# Patient Record
Sex: FEMALE | Race: BLACK OR AFRICAN AMERICAN | NOT HISPANIC OR LATINO
[De-identification: names, ages, dates, MRNs, and addresses within clinical notes are randomized per-mention and may not be internally consistent; named-entity substitution may affect disease eponyms.]

---

## 2017-02-10 ENCOUNTER — APPOINTMENT (OUTPATIENT)
Dept: ORTHOPEDIC SURGERY | Facility: CLINIC | Age: 62
End: 2017-02-10

## 2017-05-22 ENCOUNTER — APPOINTMENT (OUTPATIENT)
Dept: ORTHOPEDIC SURGERY | Facility: CLINIC | Age: 62
End: 2017-05-22

## 2017-08-01 ENCOUNTER — OUTPATIENT (OUTPATIENT)
Dept: OUTPATIENT SERVICES | Facility: HOSPITAL | Age: 62
LOS: 1 days | End: 2017-08-01
Payer: MEDICAID

## 2017-08-01 VITALS
DIASTOLIC BLOOD PRESSURE: 84 MMHG | TEMPERATURE: 98 F | RESPIRATION RATE: 16 BRPM | OXYGEN SATURATION: 99 % | SYSTOLIC BLOOD PRESSURE: 136 MMHG | HEIGHT: 65 IN | HEART RATE: 65 BPM | WEIGHT: 158.95 LBS

## 2017-08-01 DIAGNOSIS — T84.038A MECHANICAL LOOSENING OF OTHER INTERNAL PROSTHETIC JOINT, INITIAL ENCOUNTER: ICD-10-CM

## 2017-08-01 DIAGNOSIS — Z98.890 OTHER SPECIFIED POSTPROCEDURAL STATES: Chronic | ICD-10-CM

## 2017-08-01 DIAGNOSIS — Z98.1 ARTHRODESIS STATUS: Chronic | ICD-10-CM

## 2017-08-01 DIAGNOSIS — Z90.11 ACQUIRED ABSENCE OF RIGHT BREAST AND NIPPLE: Chronic | ICD-10-CM

## 2017-08-01 DIAGNOSIS — I10 ESSENTIAL (PRIMARY) HYPERTENSION: ICD-10-CM

## 2017-08-01 DIAGNOSIS — Z91.040 LATEX ALLERGY STATUS: ICD-10-CM

## 2017-08-01 DIAGNOSIS — Z96.652 PRESENCE OF LEFT ARTIFICIAL KNEE JOINT: Chronic | ICD-10-CM

## 2017-08-01 LAB
ALBUMIN SERPL ELPH-MCNC: 3.9 G/DL — SIGNIFICANT CHANGE UP (ref 3.3–5)
ALP SERPL-CCNC: 67 U/L — SIGNIFICANT CHANGE UP (ref 40–120)
ALT FLD-CCNC: 11 U/L — SIGNIFICANT CHANGE UP (ref 4–33)
APPEARANCE UR: CLEAR — SIGNIFICANT CHANGE UP
APTT BLD: 27 SEC — LOW (ref 27.5–37.4)
AST SERPL-CCNC: 16 U/L — SIGNIFICANT CHANGE UP (ref 4–32)
BILIRUB SERPL-MCNC: 0.2 MG/DL — SIGNIFICANT CHANGE UP (ref 0.2–1.2)
BILIRUB UR-MCNC: NEGATIVE — SIGNIFICANT CHANGE UP
BLD GP AB SCN SERPL QL: POSITIVE — SIGNIFICANT CHANGE UP
BLOOD UR QL VISUAL: NEGATIVE — SIGNIFICANT CHANGE UP
BUN SERPL-MCNC: 22 MG/DL — SIGNIFICANT CHANGE UP (ref 7–23)
CALCIUM SERPL-MCNC: 9.4 MG/DL — SIGNIFICANT CHANGE UP (ref 8.4–10.5)
CHLORIDE SERPL-SCNC: 103 MMOL/L — SIGNIFICANT CHANGE UP (ref 98–107)
CO2 SERPL-SCNC: 27 MMOL/L — SIGNIFICANT CHANGE UP (ref 22–31)
COLOR SPEC: YELLOW — SIGNIFICANT CHANGE UP
CREAT SERPL-MCNC: 1.08 MG/DL — SIGNIFICANT CHANGE UP (ref 0.5–1.3)
DAT C3-SP REAG RBC QL: POSITIVE — SIGNIFICANT CHANGE UP
DAT POLY-SP REAG RBC QL: POSITIVE — SIGNIFICANT CHANGE UP
DIRECT COOMBS IGG: NEGATIVE — SIGNIFICANT CHANGE UP
GLUCOSE SERPL-MCNC: 96 MG/DL — SIGNIFICANT CHANGE UP (ref 70–99)
GLUCOSE UR-MCNC: NEGATIVE — SIGNIFICANT CHANGE UP
HCT VFR BLD CALC: 32.3 % — LOW (ref 34.5–45)
HGB BLD-MCNC: 10.3 G/DL — LOW (ref 11.5–15.5)
HYALINE CASTS # UR AUTO: SIGNIFICANT CHANGE UP (ref 0–?)
INR BLD: 0.93 — SIGNIFICANT CHANGE UP (ref 0.88–1.17)
KETONES UR-MCNC: NEGATIVE — SIGNIFICANT CHANGE UP
LEUKOCYTE ESTERASE UR-ACNC: NEGATIVE — SIGNIFICANT CHANGE UP
MCHC RBC-ENTMCNC: 28.7 PG — SIGNIFICANT CHANGE UP (ref 27–34)
MCHC RBC-ENTMCNC: 31.9 % — LOW (ref 32–36)
MCV RBC AUTO: 90 FL — SIGNIFICANT CHANGE UP (ref 80–100)
NITRITE UR-MCNC: NEGATIVE — SIGNIFICANT CHANGE UP
NRBC # FLD: 0 — SIGNIFICANT CHANGE UP
PH UR: 7.5 — SIGNIFICANT CHANGE UP (ref 4.6–8)
PLATELET # BLD AUTO: 243 K/UL — SIGNIFICANT CHANGE UP (ref 150–400)
PMV BLD: 11.1 FL — SIGNIFICANT CHANGE UP (ref 7–13)
POTASSIUM SERPL-MCNC: 3.7 MMOL/L — SIGNIFICANT CHANGE UP (ref 3.5–5.3)
POTASSIUM SERPL-SCNC: 3.7 MMOL/L — SIGNIFICANT CHANGE UP (ref 3.5–5.3)
PROT SERPL-MCNC: 7.3 G/DL — SIGNIFICANT CHANGE UP (ref 6–8.3)
PROT UR-MCNC: NEGATIVE — SIGNIFICANT CHANGE UP
PROTHROM AB SERPL-ACNC: 10.4 SEC — SIGNIFICANT CHANGE UP (ref 9.8–13.1)
RBC # BLD: 3.59 M/UL — LOW (ref 3.8–5.2)
RBC # FLD: 16.5 % — HIGH (ref 10.3–14.5)
RBC CASTS # UR COMP ASSIST: SIGNIFICANT CHANGE UP (ref 0–?)
RH IG SCN BLD-IMP: POSITIVE — SIGNIFICANT CHANGE UP
SODIUM SERPL-SCNC: 144 MMOL/L — SIGNIFICANT CHANGE UP (ref 135–145)
SP GR SPEC: 1.02 — SIGNIFICANT CHANGE UP (ref 1–1.03)
SQUAMOUS # UR AUTO: SIGNIFICANT CHANGE UP
UROBILINOGEN FLD QL: NORMAL E.U. — SIGNIFICANT CHANGE UP (ref 0.1–0.2)
WBC # BLD: 5.32 K/UL — SIGNIFICANT CHANGE UP (ref 3.8–10.5)
WBC # FLD AUTO: 5.32 K/UL — SIGNIFICANT CHANGE UP (ref 3.8–10.5)
WBC UR QL: SIGNIFICANT CHANGE UP (ref 0–?)

## 2017-08-01 PROCEDURE — 93010 ELECTROCARDIOGRAM REPORT: CPT

## 2017-08-01 RX ORDER — ACETAMINOPHEN 500 MG
975 TABLET ORAL ONCE
Qty: 0 | Refills: 0 | Status: COMPLETED | OUTPATIENT
Start: 2017-08-16 | End: 2017-08-16

## 2017-08-01 RX ORDER — SODIUM CHLORIDE 9 MG/ML
1000 INJECTION, SOLUTION INTRAVENOUS
Qty: 0 | Refills: 0 | Status: DISCONTINUED | OUTPATIENT
Start: 2017-08-16 | End: 2017-08-17

## 2017-08-01 RX ORDER — PANTOPRAZOLE SODIUM 20 MG/1
40 TABLET, DELAYED RELEASE ORAL ONCE
Qty: 0 | Refills: 0 | Status: COMPLETED | OUTPATIENT
Start: 2017-08-16 | End: 2017-08-16

## 2017-08-01 RX ORDER — TRAMADOL HYDROCHLORIDE 50 MG/1
50 TABLET ORAL ONCE
Qty: 0 | Refills: 0 | Status: DISCONTINUED | OUTPATIENT
Start: 2017-08-16 | End: 2017-08-16

## 2017-08-01 RX ORDER — GABAPENTIN 400 MG/1
300 CAPSULE ORAL ONCE
Qty: 0 | Refills: 0 | Status: COMPLETED | OUTPATIENT
Start: 2017-08-16 | End: 2017-08-16

## 2017-08-01 RX ORDER — SODIUM CHLORIDE 9 MG/ML
3 INJECTION INTRAMUSCULAR; INTRAVENOUS; SUBCUTANEOUS EVERY 8 HOURS
Qty: 0 | Refills: 0 | Status: DISCONTINUED | OUTPATIENT
Start: 2017-08-16 | End: 2017-08-19

## 2017-08-01 NOTE — H&P PST ADULT - MUSCULOSKELETAL COMMENTS
Left knee Progressive pain and difficulty walking with varying degrees to left knee - denies use of cane/assistive devices

## 2017-08-01 NOTE — H&P PST ADULT - ATTENDING COMMENTS
as written above  fr left knee revision for tibial loosening   Risks, benefits and alternatives discussed with patient.  Chris Middleton MD  Musculoskeletal Oncology  [phone]

## 2017-08-01 NOTE — H&P PST ADULT - HISTORY OF PRESENT ILLNESS
62y/o female with medical h/o HTN, with prior h/o Right breast cancer and had Lumpectomy done 2014. Pt reports she completed radiation in 10/2014 - denies chemotherapy and have been taking Letrozole. Pt reports previous Left knee replacement in 9/2016, and fell in 10/2016 and re-injured left knee. Pt presents today for presurgical evaluation for Revision Left Knee Arthroplasty scheduled for 8/16/2017.

## 2017-08-01 NOTE — H&P PST ADULT - PROBLEM SELECTOR PLAN 1
Revision Left Knee Arthroplasty scheduled for 8/16/2017.  Pre-op instructions given. Pt verbalized understanding.  Pepcid given for GI prophylaxis.  Chlorhexidine wash instructions given.  ABO ordered STAT for day of procedure.  Neurontin ordered STAT for day of procedure.  Medical clearance requested - including copy of Echo/Stress results

## 2017-08-01 NOTE — H&P PST ADULT - NSANTHOSAYNRD_GEN_A_CORE
No. SVETA screening performed.  STOP BANG Legend: 0-2 = LOW Risk; 3-4 = INTERMEDIATE Risk; 5-8 = HIGH Risk

## 2017-08-01 NOTE — H&P PST ADULT - ANESTHESIA, PREVIOUS REACTION, PROFILE
Reports she woke up coughing s/p ankle surgery in 2003. Pt denies family h/o anesthesia problems/respiratory complications

## 2017-08-01 NOTE — H&P PST ADULT - VISION (WITH CORRECTIVE LENSES IF THE PATIENT USUALLY WEARS THEM):
Reading Glasses/Partially impaired: cannot see medication labels or newsprint, but can see obstacles in path, and the surrounding layout; can count fingers at arm's length

## 2017-08-01 NOTE — H&P PST ADULT - PSH
H/O lumpectomy  right axillary lymph node removed - 3/2017  H/O total knee replacement, left  9/2016  History of ankle fusion  hardware implants -2/2003  History of lumpectomy of right breast  9/2016 -sentinel lymph node biopsy  History of thyroid surgery  goitre removed 11/2014

## 2017-08-01 NOTE — H&P PST ADULT - PMH
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

## 2017-08-01 NOTE — H&P PST ADULT - MUSCULOSKELETAL
detailed exam no joint swelling/no joint warmth/no calf tenderness/normal strength/decreased ROM due to pain/no joint erythema details…

## 2017-08-01 NOTE — H&P PST ADULT - NEGATIVE MUSCULOSKELETAL SYMPTOMS
no muscle cramps/no leg pain R/no stiffness/no back pain/no myalgia/no joint swelling/no muscle weakness/no neck pain/no arm pain L/no arm pain R

## 2017-08-01 NOTE — H&P PST ADULT - NEGATIVE CARDIOVASCULAR SYMPTOMS
no peripheral edema/no palpitations/no orthopnea/no dyspnea on exertion/no chest pain/no claudication/no paroxysmal nocturnal dyspnea

## 2017-08-02 LAB
ANTIBODY ID 1_1: SIGNIFICANT CHANGE UP
ANTIBODY ID 1_2: SIGNIFICANT CHANGE UP
SPECIMEN SOURCE: SIGNIFICANT CHANGE UP
SPECIMEN SOURCE: SIGNIFICANT CHANGE UP

## 2017-08-02 PROCEDURE — 86077 PHYS BLOOD BANK SERV XMATCH: CPT

## 2017-08-03 LAB
BACTERIA NPH CULT: SIGNIFICANT CHANGE UP
BACTERIA UR CULT: SIGNIFICANT CHANGE UP

## 2017-08-06 ENCOUNTER — TRANSCRIPTION ENCOUNTER (OUTPATIENT)
Age: 62
End: 2017-08-06

## 2017-08-16 ENCOUNTER — RESULT REVIEW (OUTPATIENT)
Age: 62
End: 2017-08-16

## 2017-08-16 ENCOUNTER — INPATIENT (INPATIENT)
Facility: HOSPITAL | Age: 62
LOS: 2 days | Discharge: HOME HEALTH | End: 2017-08-19
Attending: ORTHOPAEDIC SURGERY | Admitting: ORTHOPAEDIC SURGERY
Payer: MEDICAID

## 2017-08-16 ENCOUNTER — APPOINTMENT (OUTPATIENT)
Dept: ORTHOPEDIC SURGERY | Facility: HOSPITAL | Age: 62
End: 2017-08-16

## 2017-08-16 VITALS
RESPIRATION RATE: 16 BRPM | OXYGEN SATURATION: 100 % | HEART RATE: 61 BPM | TEMPERATURE: 98 F | WEIGHT: 158.95 LBS | HEIGHT: 65 IN | DIASTOLIC BLOOD PRESSURE: 82 MMHG | SYSTOLIC BLOOD PRESSURE: 139 MMHG

## 2017-08-16 DIAGNOSIS — Z98.890 OTHER SPECIFIED POSTPROCEDURAL STATES: Chronic | ICD-10-CM

## 2017-08-16 DIAGNOSIS — T84.038A MECHANICAL LOOSENING OF OTHER INTERNAL PROSTHETIC JOINT, INITIAL ENCOUNTER: ICD-10-CM

## 2017-08-16 DIAGNOSIS — Z90.11 ACQUIRED ABSENCE OF RIGHT BREAST AND NIPPLE: Chronic | ICD-10-CM

## 2017-08-16 DIAGNOSIS — Z96.652 PRESENCE OF LEFT ARTIFICIAL KNEE JOINT: Chronic | ICD-10-CM

## 2017-08-16 DIAGNOSIS — Z98.1 ARTHRODESIS STATUS: Chronic | ICD-10-CM

## 2017-08-16 LAB
GRAM STN WND: SIGNIFICANT CHANGE UP
GRAM STN WND: SIGNIFICANT CHANGE UP
HCT VFR BLD CALC: 34.3 % — LOW (ref 34.5–45)
HGB BLD-MCNC: 10.8 G/DL — LOW (ref 11.5–15.5)
MCHC RBC-ENTMCNC: 29.3 PG — SIGNIFICANT CHANGE UP (ref 27–34)
MCHC RBC-ENTMCNC: 31.5 % — LOW (ref 32–36)
MCV RBC AUTO: 93 FL — SIGNIFICANT CHANGE UP (ref 80–100)
NRBC # FLD: 0 — SIGNIFICANT CHANGE UP
PLATELET # BLD AUTO: 258 K/UL — SIGNIFICANT CHANGE UP (ref 150–400)
PMV BLD: 10.5 FL — SIGNIFICANT CHANGE UP (ref 7–13)
RBC # BLD: 3.69 M/UL — LOW (ref 3.8–5.2)
RBC # FLD: 16 % — HIGH (ref 10.3–14.5)
RH IG SCN BLD-IMP: POSITIVE — SIGNIFICANT CHANGE UP
SPECIMEN SOURCE: SIGNIFICANT CHANGE UP
SPECIMEN SOURCE: SIGNIFICANT CHANGE UP
WBC # BLD: 12.09 K/UL — HIGH (ref 3.8–10.5)
WBC # FLD AUTO: 12.09 K/UL — HIGH (ref 3.8–10.5)

## 2017-08-16 PROCEDURE — 27330 BIOPSY KNEE JOINT LINING: CPT | Mod: 59

## 2017-08-16 PROCEDURE — 97605 NEG PRS WND THER DME<=50SQCM: CPT | Mod: 59

## 2017-08-16 PROCEDURE — 88305 TISSUE EXAM BY PATHOLOGIST: CPT | Mod: 26

## 2017-08-16 PROCEDURE — 73560 X-RAY EXAM OF KNEE 1 OR 2: CPT | Mod: 26,LT

## 2017-08-16 PROCEDURE — 27487 REVISE/REPLACE KNEE JOINT: CPT | Mod: LT

## 2017-08-16 PROCEDURE — 88311 DECALCIFY TISSUE: CPT | Mod: 26

## 2017-08-16 PROCEDURE — 76000 FLUOROSCOPY <1 HR PHYS/QHP: CPT | Mod: 26

## 2017-08-16 RX ORDER — FENTANYL CITRATE 50 UG/ML
50 INJECTION INTRAVENOUS
Qty: 0 | Refills: 0 | Status: DISCONTINUED | OUTPATIENT
Start: 2017-08-16 | End: 2017-08-17

## 2017-08-16 RX ORDER — SENNA PLUS 8.6 MG/1
2 TABLET ORAL AT BEDTIME
Qty: 0 | Refills: 0 | Status: DISCONTINUED | OUTPATIENT
Start: 2017-08-16 | End: 2017-08-17

## 2017-08-16 RX ORDER — HYDROMORPHONE HYDROCHLORIDE 2 MG/ML
0.4 INJECTION INTRAMUSCULAR; INTRAVENOUS; SUBCUTANEOUS
Qty: 0 | Refills: 0 | Status: DISCONTINUED | OUTPATIENT
Start: 2017-08-16 | End: 2017-08-16

## 2017-08-16 RX ORDER — ACETAMINOPHEN 500 MG
650 TABLET ORAL EVERY 6 HOURS
Qty: 0 | Refills: 0 | Status: DISCONTINUED | OUTPATIENT
Start: 2017-08-16 | End: 2017-08-19

## 2017-08-16 RX ORDER — RIVAROXABAN 15 MG-20MG
10 KIT ORAL DAILY
Qty: 0 | Refills: 0 | Status: DISCONTINUED | OUTPATIENT
Start: 2017-08-17 | End: 2017-08-19

## 2017-08-16 RX ORDER — CEFAZOLIN SODIUM 1 G
2000 VIAL (EA) INJECTION EVERY 8 HOURS
Qty: 0 | Refills: 0 | Status: COMPLETED | OUTPATIENT
Start: 2017-08-16 | End: 2017-08-17

## 2017-08-16 RX ORDER — ASCORBIC ACID 60 MG
500 TABLET,CHEWABLE ORAL DAILY
Qty: 0 | Refills: 0 | Status: DISCONTINUED | OUTPATIENT
Start: 2017-08-16 | End: 2017-08-17

## 2017-08-16 RX ORDER — POLYETHYLENE GLYCOL 3350 17 G/17G
17 POWDER, FOR SOLUTION ORAL DAILY
Qty: 0 | Refills: 0 | Status: DISCONTINUED | OUTPATIENT
Start: 2017-08-16 | End: 2017-08-19

## 2017-08-16 RX ORDER — SODIUM CHLORIDE 9 MG/ML
1000 INJECTION INTRAMUSCULAR; INTRAVENOUS; SUBCUTANEOUS
Qty: 0 | Refills: 0 | Status: DISCONTINUED | OUTPATIENT
Start: 2017-08-16 | End: 2017-08-19

## 2017-08-16 RX ORDER — DOCUSATE SODIUM 100 MG
100 CAPSULE ORAL THREE TIMES A DAY
Qty: 0 | Refills: 0 | Status: DISCONTINUED | OUTPATIENT
Start: 2017-08-16 | End: 2017-08-19

## 2017-08-16 RX ORDER — FENTANYL CITRATE 50 UG/ML
50 INJECTION INTRAVENOUS
Qty: 0 | Refills: 0 | Status: DISCONTINUED | OUTPATIENT
Start: 2017-08-16 | End: 2017-08-16

## 2017-08-16 RX ORDER — HYDROCHLOROTHIAZIDE 25 MG
25 TABLET ORAL DAILY
Qty: 0 | Refills: 0 | Status: DISCONTINUED | OUTPATIENT
Start: 2017-08-16 | End: 2017-08-19

## 2017-08-16 RX ORDER — OXYCODONE HYDROCHLORIDE 5 MG/1
5 TABLET ORAL EVERY 4 HOURS
Qty: 0 | Refills: 0 | Status: DISCONTINUED | OUTPATIENT
Start: 2017-08-16 | End: 2017-08-17

## 2017-08-16 RX ORDER — OXYCODONE HYDROCHLORIDE 5 MG/1
10 TABLET ORAL EVERY 4 HOURS
Qty: 0 | Refills: 0 | Status: DISCONTINUED | OUTPATIENT
Start: 2017-08-16 | End: 2017-08-17

## 2017-08-16 RX ORDER — FERROUS SULFATE 325(65) MG
325 TABLET ORAL
Qty: 0 | Refills: 0 | Status: DISCONTINUED | OUTPATIENT
Start: 2017-08-16 | End: 2017-08-19

## 2017-08-16 RX ORDER — MORPHINE SULFATE 50 MG/1
2 CAPSULE, EXTENDED RELEASE ORAL EVERY 4 HOURS
Qty: 0 | Refills: 0 | Status: DISCONTINUED | OUTPATIENT
Start: 2017-08-16 | End: 2017-08-17

## 2017-08-16 RX ORDER — LETROZOLE 2.5 MG/1
1 TABLET, FILM COATED ORAL
Qty: 0 | Refills: 0 | COMMUNITY

## 2017-08-16 RX ORDER — LETROZOLE 2.5 MG/1
2.5 TABLET, FILM COATED ORAL DAILY
Qty: 0 | Refills: 0 | Status: DISCONTINUED | OUTPATIENT
Start: 2017-08-16 | End: 2017-08-19

## 2017-08-16 RX ORDER — ASCORBIC ACID 60 MG
1 TABLET,CHEWABLE ORAL
Qty: 0 | Refills: 0 | COMMUNITY

## 2017-08-16 RX ORDER — ONDANSETRON 8 MG/1
4 TABLET, FILM COATED ORAL EVERY 6 HOURS
Qty: 0 | Refills: 0 | Status: DISCONTINUED | OUTPATIENT
Start: 2017-08-16 | End: 2017-08-19

## 2017-08-16 RX ORDER — MAGNESIUM HYDROXIDE 400 MG/1
30 TABLET, CHEWABLE ORAL DAILY
Qty: 0 | Refills: 0 | Status: DISCONTINUED | OUTPATIENT
Start: 2017-08-16 | End: 2017-08-19

## 2017-08-16 RX ORDER — ONDANSETRON 8 MG/1
4 TABLET, FILM COATED ORAL EVERY 4 HOURS
Qty: 0 | Refills: 0 | Status: DISCONTINUED | OUTPATIENT
Start: 2017-08-16 | End: 2017-08-16

## 2017-08-16 RX ORDER — FERROUS SULFATE 325(65) MG
1 TABLET ORAL
Qty: 0 | Refills: 0 | COMMUNITY

## 2017-08-16 RX ADMIN — Medication 975 MILLIGRAM(S): at 10:46

## 2017-08-16 RX ADMIN — OXYCODONE HYDROCHLORIDE 5 MILLIGRAM(S): 5 TABLET ORAL at 18:21

## 2017-08-16 RX ADMIN — OXYCODONE HYDROCHLORIDE 10 MILLIGRAM(S): 5 TABLET ORAL at 23:16

## 2017-08-16 RX ADMIN — FENTANYL CITRATE 50 MICROGRAM(S): 50 INJECTION INTRAVENOUS at 18:00

## 2017-08-16 RX ADMIN — FENTANYL CITRATE 50 MICROGRAM(S): 50 INJECTION INTRAVENOUS at 18:10

## 2017-08-16 RX ADMIN — PANTOPRAZOLE SODIUM 40 MILLIGRAM(S): 20 TABLET, DELAYED RELEASE ORAL at 10:46

## 2017-08-16 RX ADMIN — Medication 100 MILLIGRAM(S): at 21:49

## 2017-08-16 RX ADMIN — GABAPENTIN 300 MILLIGRAM(S): 400 CAPSULE ORAL at 10:46

## 2017-08-16 RX ADMIN — FENTANYL CITRATE 50 MICROGRAM(S): 50 INJECTION INTRAVENOUS at 18:01

## 2017-08-16 RX ADMIN — SODIUM CHLORIDE 30 MILLILITER(S): 9 INJECTION, SOLUTION INTRAVENOUS at 10:47

## 2017-08-16 RX ADMIN — OXYCODONE HYDROCHLORIDE 5 MILLIGRAM(S): 5 TABLET ORAL at 18:45

## 2017-08-16 RX ADMIN — TRAMADOL HYDROCHLORIDE 50 MILLIGRAM(S): 50 TABLET ORAL at 10:46

## 2017-08-16 RX ADMIN — OXYCODONE HYDROCHLORIDE 10 MILLIGRAM(S): 5 TABLET ORAL at 23:10

## 2017-08-16 RX ADMIN — FENTANYL CITRATE 50 MICROGRAM(S): 50 INJECTION INTRAVENOUS at 17:39

## 2017-08-16 RX ADMIN — SODIUM CHLORIDE 3 MILLILITER(S): 9 INJECTION INTRAMUSCULAR; INTRAVENOUS; SUBCUTANEOUS at 21:39

## 2017-08-16 NOTE — PROGRESS NOTE ADULT - SUBJECTIVE AND OBJECTIVE BOX
POST OP CHECK    Patient seen and examined, recovering well from anesthesia. Pain controlled. No acute events since leaving the OR.      MEDICATIONS  (STANDING):  sodium chloride 0.9% lock flush 3 milliLiter(s) IV Push every 8 hours  lactated ringers. 1000 milliLiter(s) IV Continuous <Continuous>  polyethylene glycol 3350 17 Gram(s) Oral daily  docusate sodium 100 milliGRAM(s) Oral three times a day  letrozole 2.5 milliGRAM(s) Oral daily  hydrochlorothiazide 25 milliGRAM(s) Oral daily  ferrous    sulfate 325 milliGRAM(s) Oral two times a day with meals  calcium carbonate  625 mG + Vitamin D (OsCal 250 + D) 1 Tablet(s) Oral two times a day  ascorbic acid 500 milliGRAM(s) Oral daily  ceFAZolin   IVPB 2000 milliGRAM(s) IV Intermittent every 8 hours  sodium chloride 0.9%. 1000 milliLiter(s) IV Continuous <Continuous>    Allergies    adhesives (Rash)  latex (Rash)  penicillin (Other)    Intolerances      Vital Signs Last 24 Hrs  T(C): 36.5 (08-16-17 @ 19:00), Max: 36.7 (08-16-17 @ 16:50)  T(F): 97.7 (08-16-17 @ 19:00), Max: 98.1 (08-16-17 @ 16:50)  HR: 53 (08-16-17 @ 19:00) (53 - 68)  BP: 142/70 (08-16-17 @ 19:00) (120/71 - 142/70)  BP(mean): --  RR: 16 (08-16-17 @ 19:00) (14 - 18)  SpO2: 98% (08-16-17 @ 19:00) (94% - 100%)    Physical Exam  Gen: NAD  LLE:   prevena vac on c/d/i  HV drain serosang output  ace wrap in place  +HMV  +ehl/fhl/ta/gs function  L2-S1 silt  Dp/pt pulse intact  No calf ttp  Compartments soft    A/P: 61y Female sp L revision TKA POD 0  Pain control  DVT ppx, xarelto  HV drain  prevena vac  FU OR Cx  PT/WBAT/OOB  FU labs, had to redraw cbc first specimen clotted  Ice/elevate  Medical management appreciated  Incentive spirometry  Dispo planning

## 2017-08-17 ENCOUNTER — TRANSCRIPTION ENCOUNTER (OUTPATIENT)
Age: 62
End: 2017-08-17

## 2017-08-17 DIAGNOSIS — C50.911 MALIGNANT NEOPLASM OF UNSPECIFIED SITE OF RIGHT FEMALE BREAST: ICD-10-CM

## 2017-08-17 DIAGNOSIS — G89.18 OTHER ACUTE POSTPROCEDURAL PAIN: ICD-10-CM

## 2017-08-17 DIAGNOSIS — I10 ESSENTIAL (PRIMARY) HYPERTENSION: ICD-10-CM

## 2017-08-17 DIAGNOSIS — C50.912 MALIGNANT NEOPLASM OF UNSPECIFIED SITE OF LEFT FEMALE BREAST: ICD-10-CM

## 2017-08-17 DIAGNOSIS — Z29.9 ENCOUNTER FOR PROPHYLACTIC MEASURES, UNSPECIFIED: ICD-10-CM

## 2017-08-17 LAB
BUN SERPL-MCNC: 18 MG/DL — SIGNIFICANT CHANGE UP (ref 7–23)
CALCIUM SERPL-MCNC: 9 MG/DL — SIGNIFICANT CHANGE UP (ref 8.4–10.5)
CHLORIDE SERPL-SCNC: 104 MMOL/L — SIGNIFICANT CHANGE UP (ref 98–107)
CO2 SERPL-SCNC: 23 MMOL/L — SIGNIFICANT CHANGE UP (ref 22–31)
CREAT SERPL-MCNC: 0.85 MG/DL — SIGNIFICANT CHANGE UP (ref 0.5–1.3)
CULTURE - ACID FAST SMEAR CONCENTRATED: SIGNIFICANT CHANGE UP
CULTURE - ACID FAST SMEAR CONCENTRATED: SIGNIFICANT CHANGE UP
GLUCOSE SERPL-MCNC: 105 MG/DL — HIGH (ref 70–99)
HCT VFR BLD CALC: 31.2 % — LOW (ref 34.5–45)
HGB BLD-MCNC: 9.8 G/DL — LOW (ref 11.5–15.5)
MCHC RBC-ENTMCNC: 29.2 PG — SIGNIFICANT CHANGE UP (ref 27–34)
MCHC RBC-ENTMCNC: 31.4 % — LOW (ref 32–36)
MCV RBC AUTO: 92.9 FL — SIGNIFICANT CHANGE UP (ref 80–100)
NRBC # FLD: 0 — SIGNIFICANT CHANGE UP
PLATELET # BLD AUTO: 222 K/UL — SIGNIFICANT CHANGE UP (ref 150–400)
PMV BLD: 11.1 FL — SIGNIFICANT CHANGE UP (ref 7–13)
POTASSIUM SERPL-MCNC: 3.7 MMOL/L — SIGNIFICANT CHANGE UP (ref 3.5–5.3)
POTASSIUM SERPL-SCNC: 3.7 MMOL/L — SIGNIFICANT CHANGE UP (ref 3.5–5.3)
RBC # BLD: 3.36 M/UL — LOW (ref 3.8–5.2)
RBC # FLD: 16.2 % — HIGH (ref 10.3–14.5)
SODIUM SERPL-SCNC: 141 MMOL/L — SIGNIFICANT CHANGE UP (ref 135–145)
SPECIMEN SOURCE: SIGNIFICANT CHANGE UP
SPECIMEN SOURCE: SIGNIFICANT CHANGE UP
WBC # BLD: 7.93 K/UL — SIGNIFICANT CHANGE UP (ref 3.8–10.5)
WBC # FLD AUTO: 7.93 K/UL — SIGNIFICANT CHANGE UP (ref 3.8–10.5)

## 2017-08-17 PROCEDURE — 99223 1ST HOSP IP/OBS HIGH 75: CPT

## 2017-08-17 RX ORDER — ACETAMINOPHEN 500 MG
650 TABLET ORAL EVERY 6 HOURS
Qty: 0 | Refills: 0 | Status: COMPLETED | OUTPATIENT
Start: 2017-08-17 | End: 2017-08-19

## 2017-08-17 RX ORDER — OXYCODONE HYDROCHLORIDE 5 MG/1
10 TABLET ORAL EVERY 4 HOURS
Qty: 0 | Refills: 0 | Status: DISCONTINUED | OUTPATIENT
Start: 2017-08-17 | End: 2017-08-19

## 2017-08-17 RX ORDER — GABAPENTIN 400 MG/1
100 CAPSULE ORAL THREE TIMES A DAY
Qty: 0 | Refills: 0 | Status: DISCONTINUED | OUTPATIENT
Start: 2017-08-17 | End: 2017-08-19

## 2017-08-17 RX ORDER — OXYCODONE HYDROCHLORIDE 5 MG/1
5 TABLET ORAL EVERY 4 HOURS
Qty: 0 | Refills: 0 | Status: DISCONTINUED | OUTPATIENT
Start: 2017-08-17 | End: 2017-08-19

## 2017-08-17 RX ORDER — SENNA PLUS 8.6 MG/1
2 TABLET ORAL AT BEDTIME
Qty: 0 | Refills: 0 | Status: DISCONTINUED | OUTPATIENT
Start: 2017-08-17 | End: 2017-08-19

## 2017-08-17 RX ORDER — KETOROLAC TROMETHAMINE 30 MG/ML
15 SYRINGE (ML) INJECTION EVERY 6 HOURS
Qty: 0 | Refills: 0 | Status: DISCONTINUED | OUTPATIENT
Start: 2017-08-17 | End: 2017-08-18

## 2017-08-17 RX ORDER — SODIUM CHLORIDE 9 MG/ML
1000 INJECTION INTRAMUSCULAR; INTRAVENOUS; SUBCUTANEOUS ONCE
Qty: 0 | Refills: 0 | Status: COMPLETED | OUTPATIENT
Start: 2017-08-17 | End: 2017-08-17

## 2017-08-17 RX ORDER — ZINC GLUCONATE 30 MG
50 TABLET ORAL DAILY
Qty: 0 | Refills: 0 | Status: DISCONTINUED | OUTPATIENT
Start: 2017-08-17 | End: 2017-08-19

## 2017-08-17 RX ORDER — TRAMADOL HYDROCHLORIDE 50 MG/1
50 TABLET ORAL THREE TIMES A DAY
Qty: 0 | Refills: 0 | Status: DISCONTINUED | OUTPATIENT
Start: 2017-08-17 | End: 2017-08-19

## 2017-08-17 RX ORDER — HYDROMORPHONE HYDROCHLORIDE 2 MG/ML
1 INJECTION INTRAMUSCULAR; INTRAVENOUS; SUBCUTANEOUS EVERY 4 HOURS
Qty: 0 | Refills: 0 | Status: DISCONTINUED | OUTPATIENT
Start: 2017-08-17 | End: 2017-08-17

## 2017-08-17 RX ORDER — ASCORBIC ACID 60 MG
500 TABLET,CHEWABLE ORAL DAILY
Qty: 0 | Refills: 0 | Status: DISCONTINUED | OUTPATIENT
Start: 2017-08-17 | End: 2017-08-19

## 2017-08-17 RX ORDER — HYDROMORPHONE HYDROCHLORIDE 2 MG/ML
0.5 INJECTION INTRAMUSCULAR; INTRAVENOUS; SUBCUTANEOUS EVERY 4 HOURS
Qty: 0 | Refills: 0 | Status: DISCONTINUED | OUTPATIENT
Start: 2017-08-17 | End: 2017-08-19

## 2017-08-17 RX ADMIN — Medication 650 MILLIGRAM(S): at 11:49

## 2017-08-17 RX ADMIN — OXYCODONE HYDROCHLORIDE 10 MILLIGRAM(S): 5 TABLET ORAL at 10:08

## 2017-08-17 RX ADMIN — SODIUM CHLORIDE 3 MILLILITER(S): 9 INJECTION INTRAMUSCULAR; INTRAVENOUS; SUBCUTANEOUS at 05:43

## 2017-08-17 RX ADMIN — Medication 325 MILLIGRAM(S): at 17:33

## 2017-08-17 RX ADMIN — GABAPENTIN 100 MILLIGRAM(S): 400 CAPSULE ORAL at 21:34

## 2017-08-17 RX ADMIN — SODIUM CHLORIDE 3 MILLILITER(S): 9 INJECTION INTRAMUSCULAR; INTRAVENOUS; SUBCUTANEOUS at 21:35

## 2017-08-17 RX ADMIN — HYDROMORPHONE HYDROCHLORIDE 0.5 MILLIGRAM(S): 2 INJECTION INTRAMUSCULAR; INTRAVENOUS; SUBCUTANEOUS at 11:17

## 2017-08-17 RX ADMIN — SENNA PLUS 2 TABLET(S): 8.6 TABLET ORAL at 21:34

## 2017-08-17 RX ADMIN — SODIUM CHLORIDE 3 MILLILITER(S): 9 INJECTION INTRAMUSCULAR; INTRAVENOUS; SUBCUTANEOUS at 14:54

## 2017-08-17 RX ADMIN — MORPHINE SULFATE 2 MILLIGRAM(S): 50 CAPSULE, EXTENDED RELEASE ORAL at 05:22

## 2017-08-17 RX ADMIN — Medication 15 MILLIGRAM(S): at 20:00

## 2017-08-17 RX ADMIN — Medication 100 MILLIGRAM(S): at 21:34

## 2017-08-17 RX ADMIN — GABAPENTIN 100 MILLIGRAM(S): 400 CAPSULE ORAL at 14:54

## 2017-08-17 RX ADMIN — Medication 50 MILLIGRAM(S): at 17:33

## 2017-08-17 RX ADMIN — Medication 1 TABLET(S): at 11:49

## 2017-08-17 RX ADMIN — MORPHINE SULFATE 2 MILLIGRAM(S): 50 CAPSULE, EXTENDED RELEASE ORAL at 01:04

## 2017-08-17 RX ADMIN — Medication 25 MILLIGRAM(S): at 05:43

## 2017-08-17 RX ADMIN — TRAMADOL HYDROCHLORIDE 50 MILLIGRAM(S): 50 TABLET ORAL at 21:34

## 2017-08-17 RX ADMIN — Medication 100 MILLIGRAM(S): at 05:43

## 2017-08-17 RX ADMIN — Medication 100 MILLIGRAM(S): at 05:44

## 2017-08-17 RX ADMIN — Medication 325 MILLIGRAM(S): at 05:43

## 2017-08-17 RX ADMIN — Medication 15 MILLIGRAM(S): at 14:53

## 2017-08-17 RX ADMIN — HYDROMORPHONE HYDROCHLORIDE 0.5 MILLIGRAM(S): 2 INJECTION INTRAMUSCULAR; INTRAVENOUS; SUBCUTANEOUS at 10:15

## 2017-08-17 RX ADMIN — RIVAROXABAN 10 MILLIGRAM(S): KIT at 11:49

## 2017-08-17 RX ADMIN — POLYETHYLENE GLYCOL 3350 17 GRAM(S): 17 POWDER, FOR SOLUTION ORAL at 11:50

## 2017-08-17 RX ADMIN — Medication 15 MILLIGRAM(S): at 08:10

## 2017-08-17 RX ADMIN — MORPHINE SULFATE 2 MILLIGRAM(S): 50 CAPSULE, EXTENDED RELEASE ORAL at 05:21

## 2017-08-17 RX ADMIN — Medication 500 MILLIGRAM(S): at 11:49

## 2017-08-17 RX ADMIN — MORPHINE SULFATE 2 MILLIGRAM(S): 50 CAPSULE, EXTENDED RELEASE ORAL at 01:05

## 2017-08-17 RX ADMIN — OXYCODONE HYDROCHLORIDE 10 MILLIGRAM(S): 5 TABLET ORAL at 09:11

## 2017-08-17 RX ADMIN — LETROZOLE 2.5 MILLIGRAM(S): 2.5 TABLET, FILM COATED ORAL at 05:42

## 2017-08-17 RX ADMIN — SODIUM CHLORIDE 2000 MILLILITER(S): 9 INJECTION INTRAMUSCULAR; INTRAVENOUS; SUBCUTANEOUS at 05:43

## 2017-08-17 RX ADMIN — Medication 650 MILLIGRAM(S): at 17:33

## 2017-08-17 RX ADMIN — Medication 100 MILLIGRAM(S): at 14:54

## 2017-08-17 RX ADMIN — TRAMADOL HYDROCHLORIDE 50 MILLIGRAM(S): 50 TABLET ORAL at 14:54

## 2017-08-17 NOTE — DISCHARGE NOTE ADULT - NS AS DC FOLLOWUP STROKE INST
Smoking Cessation/knee, exercise worksheet, ecotrin, oxy ir knee, exercise worksheet, gabapentin, tramadol, xarelto,  oxy ir/Smoking Cessation

## 2017-08-17 NOTE — DISCHARGE NOTE ADULT - CARE PROVIDER_API CALL
Chris Middleton (MD), Orthopaedic Surgery  [address] [place] [address]  Phone: [phone]  Fax: [phone]

## 2017-08-17 NOTE — PROGRESS NOTE ADULT - SUBJECTIVE AND OBJECTIVE BOX
POD 1 from revision L TKA. No acute events overnight. Pain moderately controlled this am, 5/10. No chest pain, no nausea/vomiting, no SOB.     ICU Vital Signs Last 24 Hrs  T(C): 36.9 (17 Aug 2017 05:38), Max: 37.1 (17 Aug 2017 00:57)  T(F): 98.4 (17 Aug 2017 05:38), Max: 98.8 (17 Aug 2017 00:57)  HR: 71 (17 Aug 2017 05:38) (53 - 71)  BP: 111/55 (17 Aug 2017 05:38) (99/50 - 142/70)  RR: 16 (17 Aug 2017 05:38) (14 - 18)  SpO2: 100% (17 Aug 2017 05:38) (94% - 100%)    PE:  Vitals: Afebrile, VSS  Gen: No acute distress  LLE:   Motor intact TA/GCS/EHL/FHL   SILT DP/SP/Tib  DP, PT, AT pulses palpable  Provena vac in place  Hemovac drain - 145/145.  ACE wrapping removed  cap refill <2 sec, wwp    A/P: 61F POD1 s/p Revision L TKA  -pain control  -PT  -WBAT  -OOB  -DVT ppx  -dispo plan - home    Champ Collins MD  Orthopedic Surgery, PGY-1   pager [phone] POD 1 from revision L TKA. No acute events overnight. Pain moderately controlled this am, 5/10. No chest pain, no nausea/vomiting, no SOB. Voiding    ICU Vital Signs Last 24 Hrs  T(C): 36.9 (17 Aug 2017 05:38), Max: 37.1 (17 Aug 2017 00:57)  T(F): 98.4 (17 Aug 2017 05:38), Max: 98.8 (17 Aug 2017 00:57)  HR: 71 (17 Aug 2017 05:38) (53 - 71)  BP: 111/55 (17 Aug 2017 05:38) (99/50 - 142/70)  RR: 16 (17 Aug 2017 05:38) (14 - 18)  SpO2: 100% (17 Aug 2017 05:38) (94% - 100%)    PE:  Vitals: Afebrile, VSS  Gen: No acute distress  LLE:   Motor intact TA/GCS/EHL/FHL   SILT DP/SP/Tib  DP, PT, AT pulses palpable  Provena vac in place  Hemovac drain - 145/145.  ACE wrapping removed  cap refill <2 sec, wwp      A/P: 61F POD1 s/p Revision L TKA  - f/u OR culture  - pain control  - OOB, PT, WBAT  - DVT ppx - continue xarelto  - dispo plan - home    Champ Collins MD  Orthopedic Surgery, PGY-1   pager [phone]

## 2017-08-17 NOTE — DISCHARGE NOTE ADULT - MEDICATION SUMMARY - MEDICATIONS TO TAKE
I will START or STAY ON the medications listed below when I get home from the hospital:    traMADol 50 mg oral tablet  -- 1 tab(s) by mouth 3 times a day MDD:3  -- Indication: For Pain     Percocet 5/325 325 mg-5 mg oral tablet  -- 1-2 tab(s) by mouth every 4-6 hours prn moderate or severe pain MDD:10  -- Caution federal law prohibits the transfer of this drug to any person other  than the person for whom it was prescribed.  May cause drowsiness.  Alcohol may intensify this effect.  Use care when operating dangerous machinery.  This prescription cannot be refilled.  This product contains acetaminophen.  Do not use  with any other product containing acetaminophen to prevent possible liver damage.  Using more of this medication than prescribed may cause serious breathing problems.    -- Indication: For Pain    rivaroxaban 10 mg oral tablet  -- 1 tab(s) by mouth once a day  -- Check with your doctor before becoming pregnant.  It is very important that you take or use this exactly as directed.  Do not skip doses or discontinue unless directed by your doctor.  Obtain medical advice before taking any non-prescription drugs as some may affect the action of this medication.  Take with food.    -- Indication: For Blood clot prevention    gabapentin 100 mg oral capsule  -- 1 cap(s) by mouth 3 times a day  -- Indication: For Pain    letrozole 2.5 mg oral tablet  -- 1 tab(s) by mouth once a day in am  -- Indication: For Home med    ferrous sulfate 325 mg (65 mg elemental iron) oral delayed release tablet  -- 1 tab(s) by mouth 2 times a day  -- Indication: For Home med    docusate sodium 100 mg oral capsule  -- 1 cap(s) by mouth 3 times a day  -- Indication: For Prevent constipation    zinc (as gluconate) 50 mg oral tablet  -- 1 tab(s) by mouth once a day  -- Indication: For supplement    pantoprazole 40 mg oral delayed release tablet  -- 1 tab(s) by mouth once a day  -- Indication: For GI protection while on  xarelto    Multiple Vitamins oral tablet  -- 1 tab(s) by mouth once a day  -- Indication: For supplement    Calcium 600+D oral tablet  -- 1 tab(s) by mouth 2 times a day  -- Indication: For supplement    Vitamin C 500 mg oral tablet  -- 1 tab(s) by mouth once a day in am  -- Indication: For supplement

## 2017-08-17 NOTE — DISCHARGE NOTE ADULT - PLAN OF CARE
pain control, surgical site healing, ambulation, return to ADL's 62yo is s/p revision left total knee arthroplasty on 8/16/17 for mechanical loosening without any intraoperative complications.  Pt is doing well and stable for discharge.  Pt is tolerating physical therapy: WBAT with cane/walker if needed, gait training. Sutures/staples to be removed on POD#14 at office visit if present.  Pt is on xarelto for DVT prophylaxis, take as instructed by surgeon.  follow up with Dr. Middleton   in two weeks. Follow up with primary care doctor in 1-2 weeks for continuity of care. Some medications may have been held or adjusted during hospitalization. Please contact your PMD when you get home to discuss. 60yo is s/p revision left total knee arthroplasty on 8/16/17 for mechanical loosening without any intraoperative complications.  Pt is doing well and stable for discharge.  Pt is tolerating physical therapy: WBAT with cane/walker if needed, gait training. Sutures/staples to be removed on POD#14 at office visit if present. Keep aquacel dressing clean dry and intact until follow up on POD#14.  Pt is on xarelto for DVT prophylaxis, take as instructed by surgeon.  follow up with Dr. Middleton   in two weeks, call to make appointment. Follow up with primary care doctor in 1-2 weeks for continuity of care. Some medications may have been held such as your hydrochlorthiazide or adjusted during hospitalization. Please contact your PMD when you get home to discuss when to resume.

## 2017-08-17 NOTE — PHYSICAL THERAPY INITIAL EVALUATION ADULT - PERTINENT HX OF CURRENT PROBLEM, REHAB EVAL
60y/o female with medical h/o HTN, with prior h/o Right breast cancer and had Lumpectomy done 2014. Pt reports she completed radiation in 10/2014 - denies chemotherapy and have been taking Letrozole. Pt reports previous Left knee replacement in 9/2016, and fell in 10/2016 and re-injured left knee. Pt presents for presurgical evaluation for Revision Left Knee Arthroplasty scheduled for 8/16/2017.

## 2017-08-17 NOTE — DISCHARGE NOTE ADULT - PATIENT PORTAL LINK FT
“You can access the [facility] Patient Portal, offered by [facility], by registering with the following website: [email or web address]”

## 2017-08-17 NOTE — CONSULT NOTE ADULT - SUBJECTIVE AND OBJECTIVE BOX
Chief complaint: left knee pain    HPI:  61F h/o OA of left knee s/p Left knee replacement in 9/2016; she fell in 10/2016 and re-injured left knee; admitted for Revision of Left Knee Arthroplasty on 8/16, now POD #1. Tolerated procedure well; had low BP today and given 1L NS bolus.      Function: [x ] Independent  [ ] Assistance  [ ] Total care  [ ] Non-ambulatory    Allergies: adhesives (Rash); latex (Rash); penicillin (Other)     HOME MEDICATIONS: [x ] Reviewed    MEDICATIONS  (STANDING):  polyethylene glycol 3350 17 Gram(s) Oral daily  docusate sodium 100 milliGRAM(s) Oral three times a day  letrozole 2.5 milliGRAM(s) Oral daily  hydrochlorothiazide 25 milliGRAM(s) Oral daily  ferrous sulfate 325 milliGRAM(s) Oral two times a day with meals  ascorbic acid 500 milliGRAM(s) Oral daily  rivaroxaban 10 milliGRAM(s) Oral daily  calcium carbonate 1250 mG + Vitamin D (OsCal 500 + D) 1 Tablet(s) Oral daily  senna 2 Tablet(s) Oral at bedtime  gabapentin 100 milliGRAM(s) Oral three times a day  traMADol 50 milliGRAM(s) Oral three times a day  acetaminophen   Tablet. 650 milliGRAM(s) Oral every 6 hours  ketorolac Injectable 15 milliGRAM(s) IV Push every 6 hours    MEDICATIONS  (PRN):  acetaminophen   Tablet 650 milliGRAM(s) Oral every 6 hours PRN For Temp over 38.3 C (100.94 F)  aluminum hydroxide/magnesium hydroxide/simethicone Suspension 30 milliLiter(s) Oral four times a day PRN Indigestion  ondansetron Injectable 4 milliGRAM(s) IV Push every 6 hours PRN Nausea and/or Vomiting  magnesium hydroxide Suspension 30 milliLiter(s) Oral daily PRN Constipation  HYDROmorphone  Injectable 0.5 milliGRAM(s) IV Push every 4 hours PRN breakthrough pain  oxyCODONE    IR 5 milliGRAM(s) Oral every 4 hours PRN mild to moderate pain  oxyCODONE    IR 10 milliGRAM(s) Oral every 4 hours PRN Severe Pain (7 - 10)    PAST MEDICAL & SURGICAL HISTORY:  Breast cancer, right breast: 2014, s/p lumpectomy, XRT, on letrozol  Hypertension  H/O ankle fusion: hardware implants -2/2003  H/O total knee replacement, left: 9/2016  History of thyroid surgery: goiter removed 11/2014    SOCIAL HISTORY:  Residence: [ ] [facility]  [ ] SNF  [x ] Community  [x ] Substance abuse: caffeine  [x ] Tobacco: former  [x ] Alcohol use: social    FAMILY HISTORY:  Family history of early CAD: mother    REVIEW OF SYSTEMS:  CONSTITUTIONAL: No fever, weight loss, or fatigue  EYES: No eye pain, visual disturbances, or discharge  ENMT:  No difficulty hearing, tinnitus, vertigo; No sinus or throat pain  NECK: No pain or stiffness  BREASTS: No pain, masses, or nipple discharge  RESPIRATORY: No cough, wheezing, chills or hemoptysis; No shortness of breath  CARDIOVASCULAR: No chest pain, palpitations, dizziness, or leg swelling  GASTROINTESTINAL: No abdominal or epigastric pain. No nausea, vomiting, or hematemesis; No diarrhea or constipation. No melena or hematochezia.  GENITOURINARY: No dysuria, frequency, hematuria, or incontinence  NEUROLOGICAL: No headaches, memory loss, loss of strength, numbness, or tremors  SKIN: No itching, burning, rashes, or lesions   LYMPH NODES: No enlarged glands  ENDOCRINE: No heat or cold intolerance; No hair loss  MUSCULOSKELETAL: No muscle or back pain  PSYCHIATRIC: No depression, anxiety, mood swings, or difficulty sleeping  HEME/LYMPH: No easy bruising, or bleeding gums  ALLERGY AND IMMUNOLOGIC: No hives or eczema    Vital Signs Last 24 Hrs  T(F): 99  Max: 99    HR: 74  (53 - 74)  BP: 127/48  (99/50 - 142/70)  RR: 16  SpO2: 99% on RA    PHYSICAL EXAM:  GENERAL: NAD, well-groomed, well-developed  HEAD:  Atraumatic, Normocephalic  EYES: Conjunctiva and sclera clear  ENMT: Moist mucous membranes  NECK: Supple, No JVD  RESPIRATORY: Clear to auscultation bilaterally; No rales, rhonchi, wheezing, or rubs  CARDIOVASCULAR: Regular rate and rhythm; No murmurs, rubs, or gallops  GASTROINTESTINAL: Soft, Nontender, Nondistended; Bowel sounds present  GENITOURINARY: Not examined  EXTREMITIES:  2+ Peripheral Pulses, No clubbing, cyanosis, or edema  NERVOUS SYSTEM: Moving all 4 extremities; No gross sensory deficits  PSYCH: Alert & Oriented X 3  HEME/LYMPH: No lymphadenopathy noted  SKIN: No rashes or lesions; Incisions C/D/I    LABS:                        9.8    7.93  )-----------( 222                    31.2       141  |  104  |  18  ----------------------------<  105  3.7   |  23  |  0.85    Ca    9.0             EKG:   Personally Reviewed:  [x ] YES      Care Discussed with Consultant(s)/Other Providers: Orthopedics Chief complaint: left knee pain    HPI:  61F h/o OA of left knee s/p Left knee replacement in 9/2016; she fell in 10/2016 and re-injured left knee; admitted for Revision of Left Knee Arthroplasty on 8/16, now POD #1. Tolerated procedure well; had low BP today and given 1L NS bolus. She denies lightheadedness, cough, fever, dyspnea, n/v.  She ambulated the hallway.  Pain is controlled.     Function: [x ] Independent  [ ] Assistance  [ ] Total care  [ ] Non-ambulatory    Allergies: adhesives (Rash); latex (Rash); penicillin (Other)     HOME MEDICATIONS: [x ] Reviewed    MEDICATIONS  (STANDING):  polyethylene glycol 3350 17 Gram(s) Oral daily  docusate sodium 100 milliGRAM(s) Oral three times a day  letrozole 2.5 milliGRAM(s) Oral daily  hydrochlorothiazide 25 milliGRAM(s) Oral daily  ferrous sulfate 325 milliGRAM(s) Oral two times a day with meals  ascorbic acid 500 milliGRAM(s) Oral daily  rivaroxaban 10 milliGRAM(s) Oral daily  calcium carbonate 1250 mG + Vitamin D (OsCal 500 + D) 1 Tablet(s) Oral daily  senna 2 Tablet(s) Oral at bedtime  gabapentin 100 milliGRAM(s) Oral three times a day  traMADol 50 milliGRAM(s) Oral three times a day  acetaminophen   Tablet. 650 milliGRAM(s) Oral every 6 hours  ketorolac Injectable 15 milliGRAM(s) IV Push every 6 hours    MEDICATIONS  (PRN):  acetaminophen   Tablet 650 milliGRAM(s) Oral every 6 hours PRN For Temp over 38.3 C (100.94 F)  aluminum hydroxide/magnesium hydroxide/simethicone Suspension 30 milliLiter(s) Oral four times a day PRN Indigestion  ondansetron Injectable 4 milliGRAM(s) IV Push every 6 hours PRN Nausea and/or Vomiting  magnesium hydroxide Suspension 30 milliLiter(s) Oral daily PRN Constipation  HYDROmorphone  Injectable 0.5 milliGRAM(s) IV Push every 4 hours PRN breakthrough pain  oxyCODONE    IR 5 milliGRAM(s) Oral every 4 hours PRN mild to moderate pain  oxyCODONE    IR 10 milliGRAM(s) Oral every 4 hours PRN Severe Pain (7 - 10)    PAST MEDICAL & SURGICAL HISTORY:  Left breast cancer: 2014, s/p lumpectomy, sentinel LN excision, XRT, on letrozol  Right axillary LN excision, benign  Hypertension  H/O ankle fusion: hardware implants -2/2003  H/O total knee replacement, left: 9/2016  H/O thyroid surgery: goiter removed 11/2014    SOCIAL HISTORY:  Residence: [ ] [facility]  [ ] SNF  [x ] Community  [x ] Substance abuse: caffeine  [x ] Tobacco: former  [x ] Alcohol use: social    FAMILY HISTORY:  Family history of early CAD: mother    REVIEW OF SYSTEMS:  CONSTITUTIONAL: No fever or fatigue  EYES: No eye pain, visual disturbances, or discharge  ENMT:  No difficulty hearing, vertigo; No sinus or throat pain  NECK: No pain or stiffness  BREASTS: No pain, masses, or nipple discharge  RESPIRATORY: No cough, wheezing, chills; No shortness of breath  CARDIOVASCULAR: No chest pain, palpitations, dizziness, or leg swelling  GASTROINTESTINAL: No abdominal or epigastric pain. No nausea, vomiting; No diarrhea or constipation  GENITOURINARY: No dysuria, frequency, or incontinence  NEUROLOGICAL: No headaches, loss of strength, numbness, or tremors  SKIN: No itching, burning, rashes, or lesions   LYMPH NODES: No enlarged glands  ENDOCRINE: No heat or cold intolerance  MUSCULOSKELETAL: + left knee pain  PSYCHIATRIC: No depression, anxiety, or difficulty sleeping  HEME/LYMPH: No easy bruising, or bleeding gums  ALLERGY AND IMMUNOLOGIC: No hives or eczema    Vital Signs Last 24 Hrs  T(F): 99  Max: 99    HR: 74  (53 - 74)  BP: 127/48  (99/50 - 142/70)  RR: 16  SpO2: 99% on RA    PHYSICAL EXAM:  GENERAL: NAD, well-groomed, well-developed  HEENT:  Atraumatic, Normocephalic; Conjunctiva and sclera clear; Moist mucous membranes, neck supple   RESPIRATORY: Clear to auscultation bilaterally; No rales, rhonchi, wheezing, or rubs  CARDIOVASCULAR: Regular rate and rhythm   GASTROINTESTINAL: Soft, Nontender, Nondistended; Bowel sounds present  EXTREMITIES:  2+ Peripheral Pulses, No clubbing, cyanosis, or edema; left knee dressing with hemovac and prevena incisional vac  NERVOUS SYSTEM: Moving all 4 extremities; No gross deficits  PSYCH: Alert & Oriented X 3  SKIN: No rashes or lesions; dressing C/D/I    LABS:                        9.8    7.93  )-----------( 222                    31.2       141  |  104  |  18  ----------------------------<  105  3.7   |  23  |  0.85    Ca    9.0             EKG:   Personally Reviewed:  [x ] YES      Care Discussed with Consultant(s)/Other Providers: Orthopedics        PMD: Dr. Ocampo  Med/onc: Dr. Joiner  Rad/onc: Dr. Castro

## 2017-08-17 NOTE — DISCHARGE NOTE ADULT - CONDITIONS AT DISCHARGE
Pt. is afebrile and offers no complaints. In no acute distress. Left knee aquacel dressing: clean, dry and intact. Pt is ambulating with a walker, tolerating diet well, and voiding in adequate amounts.

## 2017-08-17 NOTE — PHYSICAL THERAPY INITIAL EVALUATION ADULT - PLANNED THERAPY INTERVENTIONS, PT EVAL
ROM/Stair negotiation; Pt left sitting in chair in NAD; +left knee wound vac; call bell in reach./strengthening/gait training/balance training/transfer training/bed mobility training balance training/Stair negotiation; Pt left sitting in chair in NAD; +left knee wound vac; +left knee Hemovac; call bell in reach./transfer training/ROM/gait training/strengthening/bed mobility training

## 2017-08-17 NOTE — DISCHARGE NOTE ADULT - DURABLE MEDICAL EQUIPMENT AGENCY
[facility] [place] - [phone]  the above company delivered a rolling walker to the patient in hospital on 8/18/17.

## 2017-08-17 NOTE — DISCHARGE NOTE ADULT - CARE PLAN
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  Pt is doing well and stable for discharge.  Pt is tolerating physical therapy: WBAT with cane/walker if needed, gait training. Sutures/staples to be removed on POD#14 at office visit if present.  Pt is on xarelto for DVT prophylaxis, take as instructed by surgeon.  follow up with Dr. Middleton   in two weeks. Follow up with primary care doctor in 1-2 weeks for continuity of care. Some medications may have been held or adjusted during hospitalization. Please contact your PMD when you get home to discuss. 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  Pt is doing well and stable for discharge.  Pt is tolerating physical therapy: WBAT with cane/walker if needed, gait training. Sutures/staples to be removed on POD#14 at office visit if present. Keep aquacel dressing clean dry and intact until follow up on POD#14.  Pt is on xarelto for DVT prophylaxis, take as instructed by surgeon.  follow up with Dr. Middleton   in two weeks, call to make appointment. Follow up with primary care doctor in 1-2 weeks for continuity of care. Some medications may have been held such as your hydrochlorthiazide or adjusted during hospitalization. Please contact your PMD when you get home to discuss when to resume.

## 2017-08-17 NOTE — OCCUPATIONAL THERAPY INITIAL EVALUATION ADULT - PERTINENT HX OF CURRENT PROBLEM, REHAB EVAL
62 y/o F with medical h/o HTN, with prior h/o Right breast cancer and had Lumpectomy done 2014. Pt reports previous Left knee replacement in 9/2016, and fell in 10/2016 and re-injured left knee. Pt now s/p right TKA revision.

## 2017-08-17 NOTE — DISCHARGE NOTE ADULT - MEDICATION SUMMARY - MEDICATIONS TO STOP TAKING
I will STOP taking the medications listed below when I get home from the hospital:    hydroCHLOROthiazide 25 mg oral tablet  -- 1 tab(s) by mouth once a day in am

## 2017-08-17 NOTE — DISCHARGE NOTE ADULT - HOME CARE AGENCY
[facility] - [phone]  Nurse to visit the day after hospital discharge; physical therapist to follow. Please contact the home care agency at the above phone number if you have not heard from them by approximately 12 noon on the day after your hospital discharge.

## 2017-08-17 NOTE — OCCUPATIONAL THERAPY INITIAL EVALUATION ADULT - LIVES WITH, PROFILE
Pt lives in a house with steps to manage. Pt has a bathtub within bathroom. Pt lives with daughter in a house with steps to manage. Pt has a bathtub within bathroom.

## 2017-08-17 NOTE — CONSULT NOTE ADULT - ASSESSMENT
61F with primary OA of left knee presents with loosening of left knee joint prosthesis s/p Revision of Left TKA, POD #1.

## 2017-08-17 NOTE — PROGRESS NOTE ADULT - SUBJECTIVE AND OBJECTIVE BOX
ANESTHESIA POSTOP CHECK    61y Female POSTOP DAY 1 S/P revision of L TKA    Vital Signs Last 24 Hrs  T(C): 36.8 (17 Aug 2017 14:01), Max: 37.2 (17 Aug 2017 09:08)  T(F): 98.3 (17 Aug 2017 14:01), Max: 99 (17 Aug 2017 09:08)  HR: 84 (17 Aug 2017 14:01) (53 - 84)  BP: 131/76 (17 Aug 2017 14:01) (99/50 - 142/70)  BP(mean): --  RR: 17 (17 Aug 2017 14:01) (14 - 20)  SpO2: 100% (17 Aug 2017 14:01) (94% - 100%)  I&O's Summary    16 Aug 2017 07:01  -  17 Aug 2017 07:00  --------------------------------------------------------  IN: 420 mL / OUT: 995 mL / NET: -575 mL    17 Aug 2017 07:01  -  17 Aug 2017 14:28  --------------------------------------------------------  IN: 0 mL / OUT: 300 mL / NET: -300 mL        [x ] NO APPARENT ANESTHESIA COMPLICATIONS

## 2017-08-17 NOTE — DISCHARGE NOTE ADULT - INSTRUCTIONS
You have a post op appointment with Dr. Middleton on August 28, 2017 @ 10:45am. If you are unable to keep this appointment, please call the office to reschedule. Call MD if you develop a fever, or if there is redness, swelling, drainage or pain not relieved by pain medication. No heavy lifting, bending, or straining to move your bowels. Take over the counter stool softeners as needed to prevent constipation which may be caused by pain medication. no changes

## 2017-08-17 NOTE — DISCHARGE NOTE ADULT - HOSPITAL COURSE
60yo is s/p revision left total knee arthroplasty on 8/16/17 for mechanical loosening without any intraoperative complications.  Pt is doing well and stable for discharge.  Pt is tolerating physical therapy: WBAT with cane/walker if needed, gait training. Sutures/staples to be removed on POD#14 at office visit if present.  Pt is on xarelto for DVT prophylaxis, take as instructed by surgeon.  follow up with Dr. Middleton   in two weeks. Follow up with primary care doctor in 1-2 weeks for continuity of care. Some medications may have been held or adjusted during hospitalization. Please contact your PMD when you get home to discuss. 60yo is s/p revision left total knee arthroplasty on 8/16/17 for mechanical loosening without any intraoperative complications.  Pt is doing well and stable for discharge.  Pt is tolerating physical therapy: WBAT with cane/walker if needed, gait training. Sutures/staples to be removed on POD#14 at office visit if present. Keep aquacel dressing clean dry and intact until follow up on POD#14.  Pt is on xarelto for DVT prophylaxis, take as instructed by surgeon.  follow up with Dr. Middleton   in two weeks, call to make appointment. Follow up with primary care doctor in 1-2 weeks for continuity of care. Some medications may have been held or adjusted during hospitalization. Please contact your PMD when you get home to discuss. 62yo is s/p revision left total knee arthroplasty on 8/16/17 for mechanical loosening without any intraoperative complications.  Pt is doing well and stable for discharge.  Pt is tolerating physical therapy: WBAT with cane/walker if needed, gait training. Sutures/staples to be removed on POD#14 at office visit if present. Keep aquacel dressing clean dry and intact until follow up on POD#14.  Pt is on xarelto for DVT prophylaxis, take as instructed by surgeon.  follow up with Dr. Middleton   in two weeks, call to make appointment. Follow up with primary care doctor in 1-2 weeks for continuity of care. Some medications may have been held such as your hydrochlorthiazide or adjusted during hospitalization. Please contact your PMD when you get home to discuss when to resume.

## 2017-08-17 NOTE — PHYSICAL THERAPY INITIAL EVALUATION ADULT - ACTIVE RANGE OF MOTION EXAMINATION, REHAB EVAL
bilateral upper extremity Active ROM was WFL (within functional limits)/except left LE hip flex 0-75 degrees; left knee flex 0-60 degrees; left knee ext -25 degrees from neutral/bilateral  lower extremity Active ROM was WFL (within functional limits)

## 2017-08-17 NOTE — PHYSICAL THERAPY INITIAL EVALUATION ADULT - GENERAL OBSERVATIONS, REHAB EVAL
Pt found semi reclined in bed in NAD; +left knee dressing and wound vac intact. Pt found semi reclined in bed in NAD; +left knee dressing and wound vac intact; +left knee Hemovac.

## 2017-08-18 DIAGNOSIS — D64.9 ANEMIA, UNSPECIFIED: ICD-10-CM

## 2017-08-18 LAB
HCT VFR BLD CALC: 27.1 % — LOW (ref 34.5–45)
HGB BLD-MCNC: 8.8 G/DL — LOW (ref 11.5–15.5)
MCHC RBC-ENTMCNC: 29.4 PG — SIGNIFICANT CHANGE UP (ref 27–34)
MCHC RBC-ENTMCNC: 32.5 % — SIGNIFICANT CHANGE UP (ref 32–36)
MCV RBC AUTO: 90.6 FL — SIGNIFICANT CHANGE UP (ref 80–100)
NRBC # FLD: 0 — SIGNIFICANT CHANGE UP
PLATELET # BLD AUTO: 208 K/UL — SIGNIFICANT CHANGE UP (ref 150–400)
PMV BLD: 11 FL — SIGNIFICANT CHANGE UP (ref 7–13)
RBC # BLD: 2.99 M/UL — LOW (ref 3.8–5.2)
RBC # FLD: 15.9 % — HIGH (ref 10.3–14.5)
WBC # BLD: 5.11 K/UL — SIGNIFICANT CHANGE UP (ref 3.8–10.5)
WBC # FLD AUTO: 5.11 K/UL — SIGNIFICANT CHANGE UP (ref 3.8–10.5)

## 2017-08-18 PROCEDURE — 99232 SBSQ HOSP IP/OBS MODERATE 35: CPT

## 2017-08-18 RX ORDER — PANTOPRAZOLE SODIUM 20 MG/1
1 TABLET, DELAYED RELEASE ORAL
Qty: 0 | Refills: 0 | COMMUNITY
Start: 2017-08-18

## 2017-08-18 RX ORDER — PANTOPRAZOLE SODIUM 20 MG/1
40 TABLET, DELAYED RELEASE ORAL DAILY
Qty: 0 | Refills: 0 | Status: DISCONTINUED | OUTPATIENT
Start: 2017-08-18 | End: 2017-08-19

## 2017-08-18 RX ORDER — RIVAROXABAN 15 MG-20MG
1 KIT ORAL
Qty: 30 | Refills: 0 | OUTPATIENT
Start: 2017-08-18 | End: 2017-09-17

## 2017-08-18 RX ORDER — ZINC GLUCONATE 30 MG
1 TABLET ORAL
Qty: 0 | Refills: 0 | COMMUNITY
Start: 2017-08-18

## 2017-08-18 RX ORDER — TRAMADOL HYDROCHLORIDE 50 MG/1
1 TABLET ORAL
Qty: 21 | Refills: 0 | OUTPATIENT
Start: 2017-08-18 | End: 2017-08-25

## 2017-08-18 RX ORDER — DOCUSATE SODIUM 100 MG
1 CAPSULE ORAL
Qty: 0 | Refills: 0 | COMMUNITY
Start: 2017-08-18

## 2017-08-18 RX ORDER — GABAPENTIN 400 MG/1
1 CAPSULE ORAL
Qty: 42 | Refills: 0 | OUTPATIENT
Start: 2017-08-18 | End: 2017-09-01

## 2017-08-18 RX ADMIN — Medication 500 MILLIGRAM(S): at 12:07

## 2017-08-18 RX ADMIN — OXYCODONE HYDROCHLORIDE 10 MILLIGRAM(S): 5 TABLET ORAL at 20:35

## 2017-08-18 RX ADMIN — TRAMADOL HYDROCHLORIDE 50 MILLIGRAM(S): 50 TABLET ORAL at 21:16

## 2017-08-18 RX ADMIN — LETROZOLE 2.5 MILLIGRAM(S): 2.5 TABLET, FILM COATED ORAL at 06:32

## 2017-08-18 RX ADMIN — Medication 50 MILLIGRAM(S): at 12:06

## 2017-08-18 RX ADMIN — Medication 650 MILLIGRAM(S): at 12:07

## 2017-08-18 RX ADMIN — POLYETHYLENE GLYCOL 3350 17 GRAM(S): 17 POWDER, FOR SOLUTION ORAL at 12:07

## 2017-08-18 RX ADMIN — Medication 1 TABLET(S): at 12:06

## 2017-08-18 RX ADMIN — GABAPENTIN 100 MILLIGRAM(S): 400 CAPSULE ORAL at 06:32

## 2017-08-18 RX ADMIN — Medication 650 MILLIGRAM(S): at 17:29

## 2017-08-18 RX ADMIN — Medication 325 MILLIGRAM(S): at 06:33

## 2017-08-18 RX ADMIN — TRAMADOL HYDROCHLORIDE 50 MILLIGRAM(S): 50 TABLET ORAL at 06:32

## 2017-08-18 RX ADMIN — Medication 650 MILLIGRAM(S): at 00:00

## 2017-08-18 RX ADMIN — Medication 100 MILLIGRAM(S): at 13:24

## 2017-08-18 RX ADMIN — TRAMADOL HYDROCHLORIDE 50 MILLIGRAM(S): 50 TABLET ORAL at 13:24

## 2017-08-18 RX ADMIN — OXYCODONE HYDROCHLORIDE 10 MILLIGRAM(S): 5 TABLET ORAL at 06:32

## 2017-08-18 RX ADMIN — SODIUM CHLORIDE 3 MILLILITER(S): 9 INJECTION INTRAMUSCULAR; INTRAVENOUS; SUBCUTANEOUS at 13:17

## 2017-08-18 RX ADMIN — SODIUM CHLORIDE 3 MILLILITER(S): 9 INJECTION INTRAMUSCULAR; INTRAVENOUS; SUBCUTANEOUS at 21:21

## 2017-08-18 RX ADMIN — Medication 650 MILLIGRAM(S): at 06:33

## 2017-08-18 RX ADMIN — Medication 325 MILLIGRAM(S): at 17:30

## 2017-08-18 RX ADMIN — Medication 100 MILLIGRAM(S): at 06:32

## 2017-08-18 RX ADMIN — PANTOPRAZOLE SODIUM 40 MILLIGRAM(S): 20 TABLET, DELAYED RELEASE ORAL at 13:24

## 2017-08-18 RX ADMIN — OXYCODONE HYDROCHLORIDE 10 MILLIGRAM(S): 5 TABLET ORAL at 07:10

## 2017-08-18 RX ADMIN — RIVAROXABAN 10 MILLIGRAM(S): KIT at 12:06

## 2017-08-18 RX ADMIN — SODIUM CHLORIDE 3 MILLILITER(S): 9 INJECTION INTRAMUSCULAR; INTRAVENOUS; SUBCUTANEOUS at 06:35

## 2017-08-18 RX ADMIN — Medication 25 MILLIGRAM(S): at 06:32

## 2017-08-18 RX ADMIN — Medication 100 MILLIGRAM(S): at 21:16

## 2017-08-18 RX ADMIN — GABAPENTIN 100 MILLIGRAM(S): 400 CAPSULE ORAL at 21:16

## 2017-08-18 RX ADMIN — OXYCODONE HYDROCHLORIDE 5 MILLIGRAM(S): 5 TABLET ORAL at 13:07

## 2017-08-18 RX ADMIN — OXYCODONE HYDROCHLORIDE 10 MILLIGRAM(S): 5 TABLET ORAL at 21:15

## 2017-08-18 RX ADMIN — Medication 15 MILLIGRAM(S): at 01:00

## 2017-08-18 RX ADMIN — GABAPENTIN 100 MILLIGRAM(S): 400 CAPSULE ORAL at 13:24

## 2017-08-18 RX ADMIN — OXYCODONE HYDROCHLORIDE 5 MILLIGRAM(S): 5 TABLET ORAL at 12:06

## 2017-08-18 RX ADMIN — SENNA PLUS 2 TABLET(S): 8.6 TABLET ORAL at 21:16

## 2017-08-18 NOTE — PROGRESS NOTE ADULT - ASSESSMENT
61F with primary OA of left knee presents with loosening of left knee joint prosthesis s/p Revision of Left TKA, POD #2.

## 2017-08-18 NOTE — PROVIDER CONTACT NOTE (OTHER) - ASSESSMENT
Pt has Prevena vac to L knee. Vac alarm is sounding. Dressing is clean, dry, intact, no leaks evident.

## 2017-08-18 NOTE — PROGRESS NOTE ADULT - SUBJECTIVE AND OBJECTIVE BOX
Patient is seen and examined. Denies CP/SOB/DIzziness/N/V/D/HA. Pain is moderately controlled.     Vital Signs Last 24 Hrs  T(C): 36.8 (18 Aug 2017 01:58), Max: 37.2 (17 Aug 2017 09:08)  T(F): 98.2 (18 Aug 2017 01:58), Max: 99 (17 Aug 2017 09:08)  HR: 65 (18 Aug 2017 01:58) (65 - 84)  BP: 119/62 (18 Aug 2017 01:58) (109/56 - 131/76)  BP(mean): --  RR: 18 (18 Aug 2017 01:58) (17 - 20)  SpO2: 98% (18 Aug 2017 01:58) (98% - 100%)    Gen: NAD    LLE: Prevena Dressing C/D/I. HV is in place. 75/297  Motor intact 5/5 LLE. Sensation is grossly intact in the L LE. Compartments are soft, extremities are warm. DP 2+ L LE.    Labs:                        9.8    7.93  )-----------( 222      ( 17 Aug 2017 05:20 )             31.2       08-17    141  |  104  |  18  ----------------------------<  105<H>  3.7   |  23  |  0.85    Ca    9.0      17 Aug 2017 05:20        A/P: Patient is a 61yFemale s/p total knee arthroplasty, POD # 2    - Pain control / Analgesia  -Monitor HV output  - Anticoagulation with xarelto  -PT/OT  -WBAT  -OOB  -continue prevena dressing for now  -Foot Pumps/Venodynes  -F/U AM Labs  -Notify Ortho with any questions  -FU OR cultures- no organisms on gram stain, no growth to date

## 2017-08-18 NOTE — PROGRESS NOTE ADULT - SUBJECTIVE AND OBJECTIVE BOX
Chief complaint of left knee pain     SUBJECTIVE / OVERNIGHT EVENTS:  Pt c/o constipation; left knee pain is tolerable and controlled.    MEDICATIONS  (STANDING):  polyethylene glycol 3350 17 Gram(s) Oral daily  docusate sodium 100 milliGRAM(s) Oral three times a day  letrozole 2.5 milliGRAM(s) Oral daily  hydrochlorothiazide 25 milliGRAM(s) Oral daily  ferrous    sulfate 325 milliGRAM(s) Oral two times a day with meals  rivaroxaban 10 milliGRAM(s) Oral daily  calcium carbonate 1250 mG + Vitamin D (OsCal 500 + D) 1 Tablet(s) Oral daily  senna 2 Tablet(s) Oral at bedtime  gabapentin 100 milliGRAM(s) Oral three times a day  traMADol 50 milliGRAM(s) Oral three times a day  acetaminophen   Tablet. 650 milliGRAM(s) Oral every 6 hours  multivitamin 1 Tablet(s) Oral daily  ascorbic acid 500 milliGRAM(s) Oral daily  zinc gluconate 50 milliGRAM(s) Oral daily    MEDICATIONS  (PRN):  acetaminophen   Tablet 650 milliGRAM(s) Oral every 6 hours PRN For Temp over 38.3 C (100.94 F)  aluminum hydroxide/magnesium hydroxide/simethicone Suspension 30 milliLiter(s) Oral four times a day PRN Indigestion  ondansetron Injectable 4 milliGRAM(s) IV Push every 6 hours PRN Nausea and/or Vomiting  magnesium hydroxide Suspension 30 milliLiter(s) Oral daily PRN Constipation  bisacodyl Suppository 10 milliGRAM(s) Rectal daily PRN If no bowel movement by postoperative day #2  HYDROmorphone  Injectable 0.5 milliGRAM(s) IV Push every 4 hours PRN breakthrough pain  oxyCODONE    IR 5 milliGRAM(s) Oral every 4 hours PRN mild to moderate pain  oxyCODONE    IR 10 milliGRAM(s) Oral every 4 hours PRN Severe Pain (7 - 10)  artificial tears (preservative free) Ophthalmic Solution 1 Drop(s) Both EYES every 8 hours PRN Dry Eyes    Vital Signs Last 24 Hrs  T(F): 98.5 Max: 98.5    HR: 77  (65 - 84)  BP: 116/64  (109/56 - 131/76)  RR: 18   SpO2: 100% on RA     PHYSICAL EXAM:  GENERAL: NAD, well-groomed, well-developed  HEENT:  Atraumatic, Normocephalic; Conjunctiva and sclera clear; Moist mucous membranes, neck supple   RESPIRATORY: Clear to auscultation bilaterally; No rales, rhonchi, wheezing, or rubs  CARDIOVASCULAR: Regular rate and rhythm   GASTROINTESTINAL: Soft, Nontender, Nondistended; Bowel sounds present  EXTREMITIES:  2+ Peripheral Pulses, No clubbing, cyanosis, or edema; left knee dressing with hemovac and prevena incisional vac  NERVOUS SYSTEM: Moving all 4 extremities; No gross deficits  PSYCH: Alert & Oriented X 3  SKIN: No rashes or lesions; dressing C/D/I    LABS:                        8.8    5.11  )-----------( 208                    27.1        Care Discussed with Consultants/Other Providers: Orthopedics

## 2017-08-18 NOTE — PROGRESS NOTE ADULT - PROBLEM SELECTOR PLAN 1
- pain control and wound care per ortho protocol   - incentive spirometry   - bowel regimen - consider giving dulcolax

## 2017-08-19 VITALS
SYSTOLIC BLOOD PRESSURE: 102 MMHG | HEART RATE: 74 BPM | RESPIRATION RATE: 17 BRPM | TEMPERATURE: 98 F | DIASTOLIC BLOOD PRESSURE: 56 MMHG | OXYGEN SATURATION: 96 %

## 2017-08-19 PROCEDURE — 99233 SBSQ HOSP IP/OBS HIGH 50: CPT

## 2017-08-19 RX ADMIN — LETROZOLE 2.5 MILLIGRAM(S): 2.5 TABLET, FILM COATED ORAL at 06:32

## 2017-08-19 RX ADMIN — Medication 1 TABLET(S): at 11:50

## 2017-08-19 RX ADMIN — Medication 650 MILLIGRAM(S): at 06:32

## 2017-08-19 RX ADMIN — Medication 25 MILLIGRAM(S): at 06:33

## 2017-08-19 RX ADMIN — PANTOPRAZOLE SODIUM 40 MILLIGRAM(S): 20 TABLET, DELAYED RELEASE ORAL at 11:50

## 2017-08-19 RX ADMIN — Medication 50 MILLIGRAM(S): at 11:50

## 2017-08-19 RX ADMIN — Medication 650 MILLIGRAM(S): at 01:06

## 2017-08-19 RX ADMIN — RIVAROXABAN 10 MILLIGRAM(S): KIT at 11:50

## 2017-08-19 RX ADMIN — Medication 325 MILLIGRAM(S): at 11:35

## 2017-08-19 RX ADMIN — OXYCODONE HYDROCHLORIDE 10 MILLIGRAM(S): 5 TABLET ORAL at 07:30

## 2017-08-19 RX ADMIN — OXYCODONE HYDROCHLORIDE 10 MILLIGRAM(S): 5 TABLET ORAL at 06:32

## 2017-08-19 RX ADMIN — TRAMADOL HYDROCHLORIDE 50 MILLIGRAM(S): 50 TABLET ORAL at 06:32

## 2017-08-19 RX ADMIN — Medication 500 MILLIGRAM(S): at 11:50

## 2017-08-19 RX ADMIN — Medication 100 MILLIGRAM(S): at 06:33

## 2017-08-19 RX ADMIN — OXYCODONE HYDROCHLORIDE 10 MILLIGRAM(S): 5 TABLET ORAL at 11:56

## 2017-08-19 RX ADMIN — GABAPENTIN 100 MILLIGRAM(S): 400 CAPSULE ORAL at 06:33

## 2017-08-19 RX ADMIN — SODIUM CHLORIDE 3 MILLILITER(S): 9 INJECTION INTRAMUSCULAR; INTRAVENOUS; SUBCUTANEOUS at 06:33

## 2017-08-19 NOTE — PROGRESS NOTE ADULT - SUBJECTIVE AND OBJECTIVE BOX
Patient is seen and examined. Denies CP/SOB/DIzziness/N/V/D/HA. Pain is moderately controlled.     ICU Vital Signs Last 24 Hrs  T(C): 36.8 (19 Aug 2017 06:28), Max: 37.2 (18 Aug 2017 13:15)  T(F): 98.3 (19 Aug 2017 06:28), Max: 99 (18 Aug 2017 17:08)  HR: 71 (19 Aug 2017 06:28) (64 - 77)  BP: 122/69 (19 Aug 2017 06:28) (112/68 - 122/69)  BP(mean): --  ABP: --  ABP(mean): --  RR: 18 (19 Aug 2017 06:28) (17 - 19)  SpO2: 97% (19 Aug 2017 06:28) (95% - 100%)    Gen: NAD    LLE: Prevena Dressing C/D/I. prevena removed. HV is in place.  Motor intact 5/5 LLE. Sensation is grossly intact in the L LE. Compartments are soft, extremities are warm. DP 2+ L LE.    A/P: Patient is a 61yFemale s/p total knee arthroplasty, POD # 3    - Pain control / Analgesia  - Monitor HV output  - Anticoagulation with xarelto  -PT/OT  -WBAT  -OOB  -continue prevena dressing for now  -Foot Pumps/Venodynes  -Notify Ortho with any questions  -FU OR cultures- no organisms on gram stain, no growth to date

## 2017-08-19 NOTE — PROGRESS NOTE ADULT - SUBJECTIVE AND OBJECTIVE BOX
Patient is a 61y old  Female who presents with a chief complaint of left knee pain (17 Aug 2017 13:40)      SUBJECTIVE / OVERNIGHT EVENTS: denies complaints    MEDICATIONS  (STANDING):  sodium chloride 0.9% lock flush 3 milliLiter(s) IV Push every 8 hours  polyethylene glycol 3350 17 Gram(s) Oral daily  docusate sodium 100 milliGRAM(s) Oral three times a day  letrozole 2.5 milliGRAM(s) Oral daily  hydrochlorothiazide 25 milliGRAM(s) Oral daily  ferrous    sulfate 325 milliGRAM(s) Oral two times a day with meals  rivaroxaban 10 milliGRAM(s) Oral daily  sodium chloride 0.9%. 1000 milliLiter(s) (100 mL/Hr) IV Continuous <Continuous>  calcium carbonate 1250 mG + Vitamin D (OsCal 500 + D) 1 Tablet(s) Oral daily  senna 2 Tablet(s) Oral at bedtime  gabapentin 100 milliGRAM(s) Oral three times a day  traMADol 50 milliGRAM(s) Oral three times a day  multivitamin 1 Tablet(s) Oral daily  ascorbic acid 500 milliGRAM(s) Oral daily  zinc gluconate 50 milliGRAM(s) Oral daily  pantoprazole    Tablet 40 milliGRAM(s) Oral daily    MEDICATIONS  (PRN):  acetaminophen   Tablet 650 milliGRAM(s) Oral every 6 hours PRN For Temp over 38.3 C (100.94 F)  aluminum hydroxide/magnesium hydroxide/simethicone Suspension 30 milliLiter(s) Oral four times a day PRN Indigestion  ondansetron Injectable 4 milliGRAM(s) IV Push every 6 hours PRN Nausea and/or Vomiting  magnesium hydroxide Suspension 30 milliLiter(s) Oral daily PRN Constipation  bisacodyl Suppository 10 milliGRAM(s) Rectal daily PRN If no bowel movement by postoperative day #2  HYDROmorphone  Injectable 0.5 milliGRAM(s) IV Push every 4 hours PRN breakthrough pain  oxyCODONE    IR 5 milliGRAM(s) Oral every 4 hours PRN mild to moderate pain  oxyCODONE    IR 10 milliGRAM(s) Oral every 4 hours PRN Severe Pain (7 - 10)  artificial tears (preservative free) Ophthalmic Solution 1 Drop(s) Both EYES every 8 hours PRN Dry Eyes      Vital Signs Last 24 Hrs  T(C): 36.8 (19 Aug 2017 09:22), Max: 37.2 (18 Aug 2017 13:15)  T(F): 98.3 (19 Aug 2017 09:22), Max: 99 (18 Aug 2017 17:08)  HR: 74 (19 Aug 2017 09:22) (64 - 74)  BP: 102/56 (19 Aug 2017 09:22) (102/56 - 122/69)  BP(mean): --  RR: 17 (19 Aug 2017 09:22) (17 - 19)  SpO2: 96% (19 Aug 2017 09:22) (95% - 99%)  CAPILLARY BLOOD GLUCOSE        I&O's Summary    18 Aug 2017 07:01  -  19 Aug 2017 07:00  --------------------------------------------------------  IN: 0 mL / OUT: 75.2 mL / NET: -75.2 mL        PHYSICAL EXAM:  GENERAL: NAD, well-groomed, well-developed  HEENT:  Atraumatic, Normocephalic; Conjunctiva and sclera clear; Moist mucous membranes, neck supple   RESPIRATORY: Clear to auscultation bilaterally; No rales, rhonchi, wheezing, or rubs  CARDIOVASCULAR: Regular rate and rhythm   GASTROINTESTINAL: Soft, Nontender, Nondistended; Bowel sounds present  EXTREMITIES:  2+ Peripheral Pulses, No clubbing, cyanosis, or edema; left knee dressing with hemovac and prevena incisional vac  NERVOUS SYSTEM: Moving all 4 extremities; No gross deficits  PSYCH: Alert & Oriented X 3  SKIN: No rashes or lesions; dressing C/D/I    LABS:                        8.8    5.11  )-----------( 208      ( 18 Aug 2017 06:45 )             27.1           Care Discussed with Consultants/Other Providers: ortho

## 2017-08-19 NOTE — PROGRESS NOTE ADULT - ASSESSMENT
61F with primary OA of left knee presents with loosening of left knee joint prosthesis s/p Revision of Left TKA, POD #3.    Problem/Plan - 1:  ·  Problem: Postoperative pain.  Plan: - pain control and wound care per ortho protocol   - incentive spirometry       Problem/Plan - 2:  ·  Problem: Postoperative anemia.  Plan: Secondary to expected blood loss; hemodynamically stable  - on FeSO4 and Vit C.     Problem/Plan - 3:  ·  Problem: Essential hypertension.  Plan: - HCTZ as BP can tolerate.     Problem/Plan - 4:  ·  Problem: Breast cancer, left.  Plan: - on letrozol.     Problem/Plan - 5:  ·  Problem: Preventive measure.  Plan: - on rivaroxaban per ortho protocol.

## 2017-08-21 LAB
SPECIMEN SOURCE: SIGNIFICANT CHANGE UP
SPECIMEN SOURCE: SIGNIFICANT CHANGE UP

## 2017-08-22 LAB
BACTERIA SKIN AEROBE CULT: SIGNIFICANT CHANGE UP
BACTERIA SKIN AEROBE CULT: SIGNIFICANT CHANGE UP

## 2017-08-23 LAB
SPECIMEN SOURCE: SIGNIFICANT CHANGE UP
SPECIMEN SOURCE: SIGNIFICANT CHANGE UP

## 2017-08-28 ENCOUNTER — APPOINTMENT (OUTPATIENT)
Dept: ORTHOPEDIC SURGERY | Facility: CLINIC | Age: 62
End: 2017-08-28
Payer: MEDICAID

## 2017-08-28 VITALS — WEIGHT: 173 LBS | BODY MASS INDEX: 28.82 KG/M2 | HEIGHT: 65 IN

## 2017-08-28 PROCEDURE — 99024 POSTOP FOLLOW-UP VISIT: CPT

## 2017-09-13 LAB
FUNGUS SPEC QL CULT: SIGNIFICANT CHANGE UP
FUNGUS SPEC QL CULT: SIGNIFICANT CHANGE UP

## 2017-09-25 ENCOUNTER — APPOINTMENT (OUTPATIENT)
Dept: ORTHOPEDIC SURGERY | Facility: CLINIC | Age: 62
End: 2017-09-25

## 2017-09-27 LAB
ACID FAST STN SPEC: SIGNIFICANT CHANGE UP
ACID FAST STN SPEC: SIGNIFICANT CHANGE UP

## 2017-10-03 ENCOUNTER — APPOINTMENT (OUTPATIENT)
Dept: ORTHOPEDIC SURGERY | Facility: CLINIC | Age: 62
End: 2017-10-03
Payer: MEDICAID

## 2017-10-03 PROCEDURE — 73562 X-RAY EXAM OF KNEE 3: CPT | Mod: LT

## 2017-10-03 PROCEDURE — 99024 POSTOP FOLLOW-UP VISIT: CPT

## 2017-12-01 ENCOUNTER — APPOINTMENT (OUTPATIENT)
Dept: ORTHOPEDIC SURGERY | Facility: CLINIC | Age: 62
End: 2017-12-01
Payer: MEDICAID

## 2017-12-01 DIAGNOSIS — M17.10 UNILATERAL PRIMARY OSTEOARTHRITIS, UNSPECIFIED KNEE: ICD-10-CM

## 2017-12-01 PROCEDURE — 99214 OFFICE O/P EST MOD 30 MIN: CPT

## 2017-12-01 PROCEDURE — 73562 X-RAY EXAM OF KNEE 3: CPT | Mod: LT

## 2017-12-01 RX ORDER — DICLOFENAC SODIUM 10 MG/G
1 GEL TOPICAL
Qty: 1 | Refills: 2 | Status: ACTIVE | COMMUNITY
Start: 2017-12-01 | End: 1900-01-01

## 2018-03-05 ENCOUNTER — APPOINTMENT (OUTPATIENT)
Dept: ORTHOPEDIC SURGERY | Facility: CLINIC | Age: 63
End: 2018-03-05
Payer: MEDICAID

## 2018-03-05 DIAGNOSIS — T84.038A MECHANICAL LOOSENING OF OTHER INTERNAL PROSTHETIC JOINT, INITIAL ENCOUNTER: ICD-10-CM

## 2018-03-05 DIAGNOSIS — Z96.659 MECHANICAL LOOSENING OF OTHER INTERNAL PROSTHETIC JOINT, INITIAL ENCOUNTER: ICD-10-CM

## 2018-03-05 PROCEDURE — 73562 X-RAY EXAM OF KNEE 3: CPT | Mod: LT

## 2018-03-05 PROCEDURE — 99214 OFFICE O/P EST MOD 30 MIN: CPT

## 2018-07-16 PROBLEM — C50.911 MALIGNANT NEOPLASM OF UNSPECIFIED SITE OF RIGHT FEMALE BREAST: Chronic | Status: INACTIVE | Noted: 2017-08-01 | Resolved: 2017-08-17

## 2018-07-30 PROBLEM — I10 ESSENTIAL (PRIMARY) HYPERTENSION: Chronic | Status: ACTIVE | Noted: 2017-08-01

## 2018-07-30 PROBLEM — Z91.040 LATEX ALLERGY STATUS: Chronic | Status: ACTIVE | Noted: 2017-08-01

## 2018-07-30 PROBLEM — T84.038A MECHANICAL LOOSENING OF OTHER INTERNAL PROSTHETIC JOINT, INITIAL ENCOUNTER: Chronic | Status: ACTIVE | Noted: 2017-08-01

## 2018-08-07 ENCOUNTER — APPOINTMENT (OUTPATIENT)
Dept: ORTHOPEDIC SURGERY | Facility: CLINIC | Age: 63
End: 2018-08-07
Payer: MEDICAID

## 2018-08-07 PROCEDURE — 73562 X-RAY EXAM OF KNEE 3: CPT | Mod: LT

## 2018-08-07 PROCEDURE — 99213 OFFICE O/P EST LOW 20 MIN: CPT

## 2018-11-09 ENCOUNTER — APPOINTMENT (OUTPATIENT)
Dept: ORTHOPEDIC SURGERY | Facility: CLINIC | Age: 63
End: 2018-11-09

## 2018-11-09 DIAGNOSIS — Z96.652 PRESENCE OF LEFT ARTIFICIAL KNEE JOINT: ICD-10-CM

## 2019-03-18 NOTE — DISCHARGE NOTE ADULT - NSFTFHOMEHTHYNRD_GEN_ALL_CORE
Additional History: Does not know how long these lesions have been there, pt worried because she had lesion on tongue that was SCC Yes

## 2019-06-27 ENCOUNTER — TRANSCRIPTION ENCOUNTER (OUTPATIENT)
Age: 64
End: 2019-06-27
